# Patient Record
Sex: FEMALE | Race: WHITE | NOT HISPANIC OR LATINO | Employment: FULL TIME | URBAN - METROPOLITAN AREA
[De-identification: names, ages, dates, MRNs, and addresses within clinical notes are randomized per-mention and may not be internally consistent; named-entity substitution may affect disease eponyms.]

---

## 2017-01-25 ENCOUNTER — MEDICAL CORRESPONDENCE (OUTPATIENT)
Dept: HEALTH INFORMATION MANAGEMENT | Facility: CLINIC | Age: 42
End: 2017-01-25

## 2017-04-26 ENCOUNTER — TRANSFERRED RECORDS (OUTPATIENT)
Dept: HEALTH INFORMATION MANAGEMENT | Facility: CLINIC | Age: 42
End: 2017-04-26

## 2017-05-15 ENCOUNTER — TELEPHONE (OUTPATIENT)
Dept: DERMATOLOGY | Facility: CLINIC | Age: 42
End: 2017-05-15

## 2017-05-15 ENCOUNTER — OFFICE VISIT (OUTPATIENT)
Dept: DERMATOLOGY | Facility: CLINIC | Age: 42
End: 2017-05-15

## 2017-05-15 VITALS — DIASTOLIC BLOOD PRESSURE: 78 MMHG | SYSTOLIC BLOOD PRESSURE: 124 MMHG | HEART RATE: 61 BPM

## 2017-05-15 DIAGNOSIS — M35.9 AUTOIMMUNE DISEASE (H): ICD-10-CM

## 2017-05-15 DIAGNOSIS — N92.6 IRREGULAR MENSTRUAL CYCLE: ICD-10-CM

## 2017-05-15 DIAGNOSIS — D23.9 DERMAL NEVUS: ICD-10-CM

## 2017-05-15 DIAGNOSIS — L63.9 ALOPECIA AREATA: ICD-10-CM

## 2017-05-15 DIAGNOSIS — L63.9 ALOPECIA AREATA: Primary | ICD-10-CM

## 2017-05-15 DIAGNOSIS — L30.9 DERMATITIS: ICD-10-CM

## 2017-05-15 LAB
ALBUMIN SERPL-MCNC: 3.4 G/DL (ref 3.4–5)
BASOPHILS # BLD AUTO: 0.1 10E9/L (ref 0–0.2)
BASOPHILS NFR BLD AUTO: 0.8 %
DEPRECATED CALCIDIOL+CALCIFEROL SERPL-MC: 39 UG/L (ref 20–75)
DHEA-S SERPL-MCNC: 26 UG/DL (ref 35–430)
DIFFERENTIAL METHOD BLD: NORMAL
EOSINOPHIL # BLD AUTO: 0.3 10E9/L (ref 0–0.7)
EOSINOPHIL NFR BLD AUTO: 4.9 %
ERYTHROCYTE [DISTWIDTH] IN BLOOD BY AUTOMATED COUNT: 13.2 % (ref 10–15)
FERRITIN SERPL-MCNC: 51 NG/ML (ref 12–150)
HCT VFR BLD AUTO: 43.2 % (ref 35–47)
HGB BLD-MCNC: 14 G/DL (ref 11.7–15.7)
IMM GRANULOCYTES # BLD: 0 10E9/L (ref 0–0.4)
IMM GRANULOCYTES NFR BLD: 0.3 %
IRON SATN MFR SERPL: 46 % (ref 15–46)
IRON SERPL-MCNC: 225 UG/DL (ref 35–180)
LYMPHOCYTES # BLD AUTO: 1.9 10E9/L (ref 0.8–5.3)
LYMPHOCYTES NFR BLD AUTO: 29.9 %
MCH RBC QN AUTO: 30.8 PG (ref 26.5–33)
MCHC RBC AUTO-ENTMCNC: 32.4 G/DL (ref 31.5–36.5)
MCV RBC AUTO: 95 FL (ref 78–100)
MONOCYTES # BLD AUTO: 0.6 10E9/L (ref 0–1.3)
MONOCYTES NFR BLD AUTO: 9.1 %
NEUTROPHILS # BLD AUTO: 3.5 10E9/L (ref 1.6–8.3)
NEUTROPHILS NFR BLD AUTO: 55 %
NRBC # BLD AUTO: 0 10*3/UL
NRBC BLD AUTO-RTO: 0 /100
PLATELET # BLD AUTO: 351 10E9/L (ref 150–450)
PROT SERPL-MCNC: 7 G/DL (ref 6.8–8.8)
RBC # BLD AUTO: 4.55 10E12/L (ref 3.8–5.2)
THYROPEROXIDASE AB SERPL-ACNC: <10 IU/ML
TIBC SERPL-MCNC: 488 UG/DL (ref 240–430)
TSH SERPL DL<=0.005 MIU/L-ACNC: 1.62 MU/L (ref 0.4–4)
WBC # BLD AUTO: 6.4 10E9/L (ref 4–11)

## 2017-05-15 RX ORDER — CLOBETASOL PROPIONATE 0.05 G/100ML
SHAMPOO TOPICAL
Qty: 1 BOTTLE | Refills: 3 | Status: SHIPPED | OUTPATIENT
Start: 2017-05-15 | End: 2017-05-23

## 2017-05-15 RX ORDER — NORETHINDRONE ACETATE AND ETHINYL ESTRADIOL .03; 1.5 MG/1; MG/1
TABLET ORAL DAILY
COMMUNITY
End: 2017-08-19

## 2017-05-15 RX ORDER — CYCLOSPORINE 25 MG/1
CAPSULE ORAL DAILY
COMMUNITY
Start: 2017-04-03 | End: 2017-08-19

## 2017-05-15 RX ORDER — MULTIVITAMIN
TABLET ORAL DAILY
COMMUNITY

## 2017-05-15 ASSESSMENT — PAIN SCALES - GENERAL: PAINLEVEL: NO PAIN (0)

## 2017-05-15 NOTE — PATIENT INSTRUCTIONS
-Use clobetasol shampoo every day for 1 week since weaning off Cyclosporine  -Clobetasol Shampoo for new hair loss prevention.   --Put on dry scalp, massage into scalp all over, leave on for 10 minutes, then lather and rinse out. Do this at least 3-4 times a week, and use other shampoos in between days.   -National Alopecia Arreata Foundation for more information  -Follow up in 6-8 weeks

## 2017-05-15 NOTE — NURSING NOTE
Dermatology Rooming Note    Lisbet Dimas's goals for this visit include:   Chief Complaint   Patient presents with     Hair/Scalp Problem     Alopecia Areata. Lisbet notes that she has been getting kenalog injections, which was helping. But now she is getting new spots.     Veronica Brooks, CMA

## 2017-05-15 NOTE — MR AVS SNAPSHOT
After Visit Summary   5/15/2017    Lisbet Dimas    MRN: [id number]           Patient Information     Date Of Birth          1975        Visit Information        Provider Department      5/15/2017 7:30 AM Ele Lo MD [facility] Dermatology        Care Instructions    -Use clobetasol shampoo every day for 1 week since weaning off Cyclosporine  -Clobetasol Shampoo for new hair loss prevention.   --Put on dry scalp, massage into scalp all over, leave on for 10 minutes, then lather and rinse out. Do this at least 3-4 times a week, and use other shampoos in between days.   -National Alopecia Arreata Foundation for more information  -Follow up in 6-8 weeks        Follow-ups after your visit        Your next 10 appointments already scheduled     May 15, 2017  9:00 AM CDT   LAB with [facility] ([facility])    [address] [place] [id number] [phone]           Patient must bring picture ID.  Patient should be prepared to give a urine specimen  Please do not eat 10-12 hours before your appointment if you are coming in fasting for labs on lipids, cholesterol, or glucose (sugar).  Pregnant women should follow their Care Team instructions. Water with medications is okay. Do not drink coffee or other fluids.   If you have concerns about taking  your medications, please ask at office or if scheduling via [place], send a message by clicking on Secure Messaging, Message Your Care Team.              Who to contact     Please call your clinic at [phone] to:    Ask questions about your health    Make or cancel appointments    Discuss your medicines    Learn about your test results    Speak to your doctor   If you have compliments or concerns about an experience at your clinic, or if you wish to file a complaint, please contact [facility] Patient Relations at [phone] or email us at  [email or web address]         Additional Information About Your Visit        MyChart Information     [employer] gives you secure access to your electronic health record. If you see a primary care provider, you can also send messages to your care team and make appointments. If you have questions, please call your primary care clinic.  If you do not have a primary care provider, please call [phone] and they will assist you.      [employer] is an electronic gateway that provides easy, online access to your medical records. With [employer], you can request a clinic appointment, read your test results, renew a prescription or communicate with your care team.     To access your existing account, please contact your [facility] or call [phone] for assistance.        Care EveryWhere ID     This is your Care EveryWhere ID. This could be used by other organizations to access your [place] medical records  [id number]        Your Vitals Were     Pulse                   61            Blood Pressure from Last 3 Encounters:   05/15/17 124/78    Weight from Last 3 Encounters:   No data found for Wt              Today, you had the following     No orders found for display       Primary Care Provider    None Specified       No primary provider on file.        Thank you!     Thank you for choosing [facility] DERMATOLOGY  for your care. Our goal is always to provide you with excellent care. Hearing back from our patients is one way we can continue to improve our services. Please take a few minutes to complete the written survey that you may receive in the mail after your visit with us. Thank you!             Your Updated Medication List - Protect others around you: Learn how to safely use, store and throw away your medicines at www.disposemymeds.org.          This list is accurate as of: 5/15/17  8:46 AM.  Always use your most recent med list.                   Brand Name Dispense  Instructions for use    Biotin 5000 MCG Caps      daily       cycloSPORINE modified capsule      daily       Multiple vitamin Tabs      daily       norethindrone-ethinyl estradiol 1.5-30 MG-MCG per tablet    MICROGESTIN 1.5/30     daily

## 2017-05-15 NOTE — LETTER
5/15/2017       RE: Lisbet Dimas  [address] [id number]     Dear Colleague,    Thank you for referring your patient, Lisbet Dimas, to the [facility] DERMATOLOGY at [facility]. Please see a copy of my visit note below.    [facility] Dermatology Note      Dermatology Problem List:  1.Alopecia areata s/p treatment with: minidoxil 5%, Rogaine, q 4 week IL kenalog injections 2.5 cc with 0.2cc to eyebrow, protopic 0.1% ointment, Prednisone 20 day taper that began at 40mg, Diprolene 0.05% lotion, Cyclosporine 100mg daily for1 week then every other day for 2 weeks, Clobetasol 0.05% drops  -Current treatment: Clobetasol 0.05% shampoo 3-4 times/week,  Biotin 5000 mcg po daily, ILK injections every 4 weeks, once weekly Rogaine foam, Gengraf 25 mg po daily (last dose today).  -Labs 5/15/17 pending: thyroid peroxidase Ab, Testosterone, DHEAS.    -labs: CBC, Ferritin, Protein, TSH, Vit D, Albumin all wnl.    -Labs revealed High Iron and iron binding.   2. Keratosis on upper arms  3. Clinically benign nevi on the right superior anterior ear    Encounter Date: May 15, 2017    CC:  Chief Complaint   Patient presents with     Hair/Scalp Problem     Alopecia Areata. Lisbet notes that she has been getting kenalog injections, which was helping. But now she is getting new spots.         History of Present Illness:  Ms. Lisbet Dimas is a 41 year old female who presents as a referral from Dr. Downey due to ophasis type pattern alopecia. Hair loss began in the fall 2014 that started in the front and then some patches on the right side of the head, and switched provider due to health insurance issues. She reports that this past fall her hair loss progressively has gotten worse with hair loss in the back. She had hair regrowth with kenalog injections but reports ongoing hair loss despite this. She has never had a biopsy taken. She reports that the hair loss in  the back is difficult to tell if it has regrowth with kenalog injections. She currently is using biotin daily and has been receiving IL  kenalog injections every 4 weeks and a one month course of po BID cyclosporine (Gengraf) 25 mg, as well as Rogaine foam with Dr. Downey. In the past she has tried Rogain, 5% minoxidil and Protopic solely on her left eyebrow. She denies prior use of minocycline or doxycycline or use with anti-dandruff shampoo, and has recently begun using [employer] derma care once a week.     Prior to the onset of her hairloss she was previously healthy, and admits to having some life stress with moving to a new place and studying to take the Bar exam, but does not think this was a trigger for her hair loss. She is an occasional meat eater with once weekly chicken or turkey. She also notes irregular menses initially thought due to exercise use as well as method of taking oral contraceptives. She does note that when she does take the sugar pill part of the contraception she does have a period. She complains of itching, without burning or pain, red spots or scarring with only a red birthmark on her posterior scalp. She has had her thyroid checked and notes her TSH is going up. She has constipation, dry skin, fatigue, personal history of keratois on her arms, heat intolerance, weight gain. She had a positive PAP with colposcopy with HPV positivity. She denies SOB, lightheadedness, night sweats, fevers, chills, unintentional weight loss, joint pain.     Past Medical History:   Previously healthy denies any other medical issues other than abnormal PAP    Surgical History:  Colposcopy  [place] teeth removal   Laser surgery on her eye for vision correction.     Social History:  The patient works as a . The patient admits to use of tanning beds a handful of times.    Family History:  There is no family history of skin cancer., There is no family history of melanoma., There is no family history of  asthma, eczema or allergies. and There is a family history of hair loss in the patient's brother with a receding hairline, and both grandfathers had male pattern baldness.   Mother with Vitiligo, psoriasis, Diabetes and Fe deficiency anemia. Brother with DMT2.  Sister with Thyroid issues and niece with thyroid issues, and another sister with vitiligo, Her Father has a prothrombin gene mutation with easy clotting.     Medications:  Current Outpatient Prescriptions   Medication Sig Dispense Refill     Biotin 5000 MCG CAPS daily       GENGRAF 25 MG PO CAPSULE daily       norethindrone-ethinyl estradiol (MICROGESTIN 1.5/30) 1.5-30 MG-MCG per tablet daily       Multiple vitamin TABS daily       No Known Allergies      Review of Systems:  -Skin/Heme New Pt: The patient denies frequent sun exposure. wears sunscreen SPF 30. The patient denies excessive scarring or problems healing except as per HPI. The patient denies excessive bleeding., Allergy/Immunology: No history of nickel or other skin allergy. No history of asthma or hay fever.,   GI: The patient denies nausea, vomiting, diarrhea or abdominal pain. She has constipation.  Rheum: The patient denies arthralgia, joint stiffness, joint swelling or myalgias. Her right knee bothers her but she thinks its an overuse injury from running.   -Constitutional: The patient denies fatigue, fevers, chills, unintended weight loss, and night sweats.  -HEENT: Patient denies nonhealing oral sores.  -Skin: As above in HPI. No additional skin concerns.    Physical exam:  Vitals: /78  Pulse 61  GEN: This is a well developed, well-nourished female in no acute distress, in a pleasant mood.    SKIN: Sun-exposed skin, which includes the head/face, neck, both arms, digits, and/or nails was examined.   -Frontal scalp and bilateral temporal scalp with patches with fine hair growth without scarring or redness. -Negative hair pull test with follicular prominence throughout  her scalp.    -Some atrophy of skin from prior kenalog injections   -Occiput scalp with what appears to be a congential vascular lesion, with smooth skin, patches with fine hair without visible pinpoint red papules or scaring.   -No visible nail pitting  - 3 mm skin-toned, soft, smooth verrucous appearing nevoid appearing lesion on the right temporal region superior to the eye along the eyeglass line.   -No other lesions of concern on areas examined.     Impression/Plan:  1. Alopecia areata  Labs today revealed High Iron and Iron binding with normal Ferritin.     Current treatment: Clobetasol 0.05% shampoo 3-4 times/week,  Biotin 5000 mcg po daily, ILK injections every 4 weeks, once weekly Rogaine foam, Gengraf 25 mg po daily (last dose today).    Photos taken today    Labs: CBC, DHEAS, Testosterone, Ferritin, Iron studies, TSH with free T4, Thyroid peroxidase antibodies, Vit D deficiency, albumin level.     Wean off Cyclosporine (Gengraf)    Clobetasol shampoo qday every week for one week, and then go to 3-4 times alternating with regular shampoo wash.     Follow with PCP for hypothyroid symptoms and labs, will also get labs today looking for thyroid antibodies.     She will need to follow with PCP for  Iron abnormallties    Information given on national alopecia areata foundation and discussed the local support group      2. Clinically benign nevi on the right superior anterior ear    Benign nature was discussed.No further intervention required at this time.     Discussed possibility of future shave biopsy procedure for removal and comfort as this spot irritates and bothers her with eye glass wear.     3. Seborrheic Keratosis on the upper arms    Sun precaution was advised including the use of sun screens of SPF 30 or higher, sun protective clothing, and avoidance of tanning beds.    Continue with daily moisturizer     CC Dr. Octavio Downey, and Dr. Yani Padillaaprparesh on close of this encounter.  Follow-up in 6-8  weeks, earlier for new or changing lesions.     Staff Involved:  Scribed by Nida Goodman, MS4 for Dr. Lo.      I agree with the PFSH and ROS as completed by the Medical Student. The remainder of the encounter was performed by me and scribed by the Medical Student. The scribed note accurately reflects my personal services and the medical decisions made by me.      Ele Lo MD  Professor and Chair  Department of Dermatology  [facility]      Pictures were placed in Pt's chart today for future reference.              Again, thank you for allowing me to participate in the care of your patient.      Sincerely,    Ele Lo MD

## 2017-05-15 NOTE — PROGRESS NOTES
[facility] Dermatology Note      Dermatology Problem List:  1.Alopecia areata s/p treatment with: minidoxil 5%, Rogaine, q 4 week IL kenalog injections 2.5 cc with 0.2cc to eyebrow, protopic 0.1% ointment, Prednisone 20 day taper that began at 40mg, Diprolene 0.05% lotion, Cyclosporine 100mg daily for1 week then every other day for 2 weeks, Clobetasol 0.05% drops  -Current treatment: Clobetasol 0.05% shampoo 3-4 times/week,  Biotin 5000 mcg po daily, ILK injections every 4 weeks, once weekly Rogaine foam, Gengraf 25 mg po daily (last dose today).  -Labs 5/15/17 pending: thyroid peroxidase Ab, Testosterone, DHEAS.    -labs: CBC, Ferritin, Protein, TSH, Vit D, Albumin all wnl.    -Labs revealed High Iron and iron binding.   2. Keratosis on upper arms  3. Clinically benign nevi on the right superior anterior ear    Encounter Date: May 15, 2017    CC:  Chief Complaint   Patient presents with     Hair/Scalp Problem     Alopecia Areata. Lisbet notes that she has been getting kenalog injections, which was helping. But now she is getting new spots.         History of Present Illness:  Ms. Lisbet Dimas is a 41 year old female who presents as a referral from Dr. Downey due to ophasis type pattern alopecia. Hair loss began in the fall 2014 that started in the front and then some patches on the right side of the head, and switched provider due to health insurance issues. She reports that this past fall her hair loss progressively has gotten worse with hair loss in the back. She had hair regrowth with kenalog injections but reports ongoing hair loss despite this. She has never had a biopsy taken. She reports that the hair loss in the back is difficult to tell if it has regrowth with kenalog injections. She currently is using biotin daily and has been receiving IL  kenalog injections every 4 weeks and a one month course of po BID cyclosporine (Gengraf) 25 mg, as well as Rogaine foam with Dr. Downey. In the past  she has tried Rogain, 5% minoxidil and Protopic solely on her left eyebrow. She denies prior use of minocycline or doxycycline or use with anti-dandruff shampoo, and has recently begun using [employer] derma care once a week.     Prior to the onset of her hairloss she was previously healthy, and admits to having some life stress with moving to a new place and studying to take the Bar exam, but does not think this was a trigger for her hair loss. She is an occasional meat eater with once weekly chicken or turkey. She also notes irregular menses initially thought due to exercise use as well as method of taking oral contraceptives. She does note that when she does take the sugar pill part of the contraception she does have a period. She complains of itching, without burning or pain, red spots or scarring with only a red birthmark on her posterior scalp. She has had her thyroid checked and notes her TSH is going up. She has constipation, dry skin, fatigue, personal history of keratois on her arms, heat intolerance, weight gain. She had a positive PAP with colposcopy with HPV positivity. She denies SOB, lightheadedness, night sweats, fevers, chills, unintentional weight loss, joint pain.     Past Medical History:   Previously healthy denies any other medical issues other than abnormal PAP    Surgical History:  Colposcopy  [place] teeth removal   Laser surgery on her eye for vision correction.     Social History:  The patient works as a . The patient admits to use of tanning beds a handful of times.    Family History:  There is no family history of skin cancer., There is no family history of melanoma., There is no family history of asthma, eczema or allergies. and There is a family history of hair loss in the patient's brother with a receding hairline, and both grandfathers had male pattern baldness.   Mother with Vitiligo, psoriasis, Diabetes and Fe deficiency anemia. Brother with DMT2.  Sister with Thyroid  issues and niece with thyroid issues, and another sister with vitiligo, Her Father has a prothrombin gene mutation with easy clotting.     Medications:  Current Outpatient Prescriptions   Medication Sig Dispense Refill     Biotin 5000 MCG CAPS daily       GENGRAF 25 MG PO CAPSULE daily       norethindrone-ethinyl estradiol (MICROGESTIN 1.5/30) 1.5-30 MG-MCG per tablet daily       Multiple vitamin TABS daily       No Known Allergies      Review of Systems:  -Skin/Heme New Pt: The patient denies frequent sun exposure. wears sunscreen SPF 30. The patient denies excessive scarring or problems healing except as per HPI. The patient denies excessive bleeding., Allergy/Immunology: No history of nickel or other skin allergy. No history of asthma or hay fever.,   GI: The patient denies nausea, vomiting, diarrhea or abdominal pain. She has constipation.  Rheum: The patient denies arthralgia, joint stiffness, joint swelling or myalgias. Her right knee bothers her but she thinks its an overuse injury from running.   -Constitutional: The patient denies fatigue, fevers, chills, unintended weight loss, and night sweats.  -HEENT: Patient denies nonhealing oral sores.  -Skin: As above in HPI. No additional skin concerns.    Physical exam:  Vitals: /78  Pulse 61  GEN: This is a well developed, well-nourished female in no acute distress, in a pleasant mood.    SKIN: Sun-exposed skin, which includes the head/face, neck, both arms, digits, and/or nails was examined.   -Frontal scalp and bilateral temporal scalp with patches with fine hair growth without scarring or redness. -Negative hair pull test with follicular prominence throughout  her scalp.   -Some atrophy of skin from prior kenalog injections   -Occiput scalp with what appears to be a congential vascular lesion, with smooth skin, patches with fine hair without visible pinpoint red papules or scaring.   -No visible nail pitting  - 3 mm skin-toned, soft, smooth verrucous  appearing nevoid appearing lesion on the right temporal region superior to the eye along the eyeglass line.   -No other lesions of concern on areas examined.     Impression/Plan:  1. Alopecia areata  Labs today revealed High Iron and Iron binding with normal Ferritin.     Current treatment: Clobetasol 0.05% shampoo 3-4 times/week,  Biotin 5000 mcg po daily, ILK injections every 4 weeks, once weekly Rogaine foam, Gengraf 25 mg po daily (last dose today).    Photos taken today    Labs: CBC, DHEAS, Testosterone, Ferritin, Iron studies, TSH with free T4, Thyroid peroxidase antibodies, Vit D deficiency, albumin level.     Wean off Cyclosporine (Gengraf)    Clobetasol shampoo qday every week for one week, and then go to 3-4 times alternating with regular shampoo wash.     Follow with PCP for hypothyroid symptoms and labs, will also get labs today looking for thyroid antibodies.     She will need to follow with PCP for  Iron abnormallties    Information given on national alopecia areata foundation and discussed the local support group      2. Clinically benign nevi on the right superior anterior ear    Benign nature was discussed.No further intervention required at this time.     Discussed possibility of future shave biopsy procedure for removal and comfort as this spot irritates and bothers her with eye glass wear.     3. Seborrheic Keratosis on the upper arms    Sun precaution was advised including the use of sun screens of SPF 30 or higher, sun protective clothing, and avoidance of tanning beds.    Continue with daily moisturizer     CC Dr. Octavio Downey, [facility] on close of this encounter.  Follow-up in 6-8 weeks, earlier for new or changing lesions.     Staff Involved:  Scribed by Nida Goodman, MS4 for Dr. Lo.      I agree with the PFSH and ROS as completed by the Medical Student. The remainder of the encounter was performed by me and scribed by the Medical Student. The scribed  note accurately reflects my personal services and the medical decisions made by me.      Ele Lo MD  Professor and Chair  Department of Dermatology  [facility]

## 2017-05-16 LAB
SHBG SERPL-SCNC: 106 NMOL/L (ref 30–135)
TESTOST FREE SERPL-MCNC: 0.01 NG/DL (ref 0.13–0.92)
TESTOST SERPL-MCNC: 5 NG/DL (ref 8–60)

## 2017-05-16 NOTE — TELEPHONE ENCOUNTER
Prior Authorization Retail Medication Request  Medication/Dose: clobetasol (Clobex) 0.05% shampoo  Diagnosis and ICD code: Autoimmune disease (H) (M35.9); Dermatitis (L30.9)  New/Renewal/Insurance Change PA: New  Previously Tried and Failed Therapies: past treatment with: minidoxil 5%, Rogaine, q 4 week kenalog injections 2.5 cc with 0.2cc to eyebrow, protopic (triamcinolone) 0.1% ointment, Prednisone 20 day taper that began at 40mg, Diprolene 0.05% lotion, Cyclosporine 100mg daily for1 week then every other day for 2 weeks, Clobetasol 0.05% drops    -Current treatment: Clobetasol 0.05% shampoo 3-4 times/week, Biotin 5000 mcg po daily, ILK injections every 4 weeks, once weekly Rogaine foam, Gengraf 25 mg po daily (last dose today).    Insurance ID (if provided): [id number]  Insurance Phone (if provided): [phone]

## 2017-05-18 NOTE — TELEPHONE ENCOUNTER
Prior Authorization Approval    Authorization Effective Date: 4/18/2017  Authorization Expiration Date: 4/18/2018  Medication: clobetasol (Clobex) 0.05% shampoo - approved  Approved Dose/Quantity:   Reference #: CMM key# [id number]   Insurance Company: [employer] - Phone [phone] Fax [phone]  Expected CoPay: $240.00     CoPay Card Available:      Foundation Assistance Needed:    Which Pharmacy is filling the prescription (Not needed for infusion/clinic administered): [facility] [id number] [address]  Pharmacy Notified: Yes  Patient Notified: Yes    PATIENT HAS A DEDUCTIBLE THAT NEEDS TO BE MET IN ORDER TO LOWER CO-PAY

## 2017-05-18 NOTE — TELEPHONE ENCOUNTER
PA Initiation    Medication: clobetasol (Clobex) 0.05% shampoo  Insurance Company: [employer] - Phone [phone] Fax [phone]  Pharmacy Filling the Rx: [facility] [id number] [place] [address] [address]  Filling Pharmacy Phone: [phone]  Filling Pharmacy Fax:    Start Date: 5/18/2017

## 2017-05-23 DIAGNOSIS — M35.9 AUTOIMMUNE DISEASE (H): ICD-10-CM

## 2017-05-23 DIAGNOSIS — L30.9 DERMATITIS: ICD-10-CM

## 2017-05-23 RX ORDER — CLOBETASOL PROPIONATE 0.05 G/100ML
SHAMPOO TOPICAL
Qty: 1 BOTTLE | Refills: 3 | Status: SHIPPED | OUTPATIENT
Start: 2017-05-23

## 2017-05-23 NOTE — TELEPHONE ENCOUNTER
Per pt request:  Could you send my Rx for the shampoo to [facility] [address] in [place]? [facility] and the price was really high. The [employer] coupon I got while at my appointment was for [facility].

## 2017-07-24 ENCOUNTER — TELEPHONE (OUTPATIENT)
Dept: DERMATOLOGY | Facility: CLINIC | Age: 42
End: 2017-07-24

## 2017-07-24 NOTE — TELEPHONE ENCOUNTER
Patient is wondering when she will be scheduled for her next follow up visit as recommendations were 6-8 weeks at [facility] on 5/18/17. She states her hair loss is worsening. She is also wondering if she should follow up with referring dermatologist since ILK injections were recommended every 4 weeks and she was not aware of this.    Patient would like a response back via [employer]

## 2017-08-19 ENCOUNTER — OFFICE VISIT (OUTPATIENT)
Dept: DERMATOLOGY | Facility: CLINIC | Age: 42
End: 2017-08-19

## 2017-08-19 VITALS — DIASTOLIC BLOOD PRESSURE: 67 MMHG | SYSTOLIC BLOOD PRESSURE: 101 MMHG | HEART RATE: 61 BPM

## 2017-08-19 DIAGNOSIS — R89.9 ABNORMAL LABORATORY TEST RESULT: ICD-10-CM

## 2017-08-19 DIAGNOSIS — D23.9 DERMAL NEVUS: ICD-10-CM

## 2017-08-19 DIAGNOSIS — L63.9 ALOPECIA AREATA: Primary | ICD-10-CM

## 2017-08-19 DIAGNOSIS — M35.9 AUTOIMMUNE DISEASE (H): ICD-10-CM

## 2017-08-19 RX ORDER — NORETHINDRONE ACETATE AND ETHINYL ESTRADIOL 1.5-30(21)
KIT ORAL
COMMUNITY
Start: 2017-07-31

## 2017-08-19 ASSESSMENT — PAIN SCALES - GENERAL: PAINLEVEL: NO PAIN (0)

## 2017-08-19 NOTE — PATIENT INSTRUCTIONS
When there is a new spot: do clobetasol shampoo daily until the hairless spot doesn't grow in size and there is new hair growth (fine baby hair counts)    Regular treatment:  - clobetasol 0.05% shampoo every other day (3 times a week)  - steroid injections every 4 to 6 weeks with Dr. Doll  - rogaine foam once daily every day  - continue Biotin and multivitamin      Recheck iron studies with Dr. Doll    With PCP:   - discuss about iron study results.

## 2017-08-19 NOTE — LETTER
"8/19/2017       RE: Lisbet Dimas  [address] [address]     Dear Colleague,    Thank you for referring your patient, Lisbet Dimas, to the [facility] DERMATOLOGY at [facility]. Please see a copy of my visit note below.    [facility] Dermatology Note      Dermatology Problem List:  1.Alopecia areata   -s/p treatment with: minidoxil 5%, Rogaine, q 4 week IL kenalog injections 2.5 cc with 0.2cc to eyebrow, protopic 0.1% ointment, Prednisone 20 day taper that began at 40mg, Diprolene 0.05% lotion, Cyclosporine 100mg daily for 1 week then every other day for 2 weeks then 25 mg po daily (last dose 5/15/17), Clobetasol 0.05% drops  -Current treatment: Clobetasol 0.05% shampoo 3-4 times/week,  Biotin 5000 mcg po daily, ILK injections every 4-6 weeks, once daily Rogaine foam.  -Labs 5/15/17 pending: thyroid peroxidase Ab, Testosterone, DHEAS.                         -labs: CBC, Ferritin, Protein, TSH, Vit D, Albumin all wnl.                         -Labs revealed High Iron and iron binding.       2. Clinically benign nevus on the right superior anterior ear  - shave removal has been discussed.    Encounter Date: Aug 19, 2017    CC:  Chief Complaint   Patient presents with     Hair Loss     A A follow up, Lisbet states \" it is getting worse.\"         History of Present Illness:  Ms. Lisbet Dimas is a 42 year old female who presents as a follow-up for alopecia areata. The patient was last seen 5/15/17 when she finished her last dose of Gengraf. She thinks hair loss in the occipital scalp is getting worse and it is accompanied by pruritus in that area and sensitivity to touch. She also thinks right temporal hairline is receding. However she does see hair regrowth on the left temporal scalp. Otherwise, she hasn't noticed a new area of hair loss or nail changes. Her current treatment consists of clobetasol 0.05% shampoo every other day, biotin " 5000 mcg po daily, and a multivitamin. She didn't realize that she was supposed to continue ILK injections every 4 weeks until she called one of our nurses this past July and was able to get ILK once since the last visit. She saw Dr. Doll on 7/20/17 when 3 areas of alopecia were injected with 5 mg/mL of triamcinolone, a total of 2 cc. She thinks that injections helped with some hair regrowth. She also did not know that she was supposed to be using Rogaine foam so hasn't been using it since the last visit.     Past Medical History:   Patient Active Problem List   Diagnosis     Alopecia areata     Autoimmune disease (H)     Dermal nevus     Irregular menstrual cycle     No past medical history on file.  No past surgical history on file.    Social History:  The patient works as a . The patient admits to use of tanning beds a handful of times. She has been training for a marathon this summer.    Family History:  There is no family history of skin cancer., There is no family history of melanoma., There is no family history of asthma, eczema or allergies. and There is a family history of hair loss in the patient's brother with a receding hairline, and both grandfathers had male pattern baldness.   Mother with Vitiligo, psoriasis, Diabetes and Fe deficiency anemia. Brother with DMT2.  Sister with Thyroid issues and niece with thyroid issues, and another sister with vitiligo, Her Father has a prothrombin gene mutation with easy clotting.    Medications:  Current Outpatient Prescriptions   Medication Sig Dispense Refill     norethindrone-ethinyl estradiol-iron (BLISOVI FE 1.5/30) 1.5-30 MG-MCG per tablet TAKE ONE TABLET BY MOUTH ONE TIME DAILY       clobetasol propionate (CLOBEX) 0.05 % SHAM Apply to a dry scalp, leave on for 10 minutes then lather and rinse. Do 5 days per week for the first week then every other day 1 Bottle 3     Biotin 5000 MCG CAPS daily       Multiple vitamin TABS daily       No Known  Allergies      Review of Systems:  -As per HPI  -Constitutional: The patient denies fatigue, fevers, chills, unintended weight loss, and night sweats.  -HEENT: Patient denies nonhealing oral sores.  -Skin: As above in HPI. No additional skin concerns.    Physical exam:  Vitals: /67  Pulse 61  GEN: This is a well developed, well-nourished female in no acute distress, in a pleasant mood.    SKIN: Sun-exposed skin, which includes the head/face, neck, both arms, digits, and/or nails was examined.   -Frontal scalp with robust regrowth of fine vellus hair and intermediate hair. Overall 50% improvement since the last visit.   -Right temporal scalp with mild enlargement in size of the patch with decreased density of hair with some atrophy of skin from prior ILK injections.   -Left temporal scalp with robust growth of indeterminate hair and vellus hair throughout, improved from prior visit.   -Occiput scalp with what appears to be a congential vascular lesion, with smooth skin, patches with fine vellus hair without visible pinpoint red papules or scaring, stable from the last visit.  -No visible nail pitting  - 3 mm skin-toned, soft, smooth verrucous appearing nevoid appearing lesion on the right temporal region superior to the eye along the eyeglass line.   -No other lesions of concern on areas examined.     Labs 5/15/2017  Ferritin 51  Iron 225 H  Iron binding capacity 488 H    Impression/Plan:  1. Alopecia areata. She has good response from ILK with evidence of regrowth of hair growth  in areas of injections. We recommended she continue ILK injections with Dr. Doll and follow with us as needed. Patient was advised to use clobetasol shampoo daily if she develops a new patch of hair loss until there is regrowth of hair. Otherwise she can continue her current treatment as below.     Continue 0.05% clobetasol shampoo every other day (3x/week)    Continue to get ILK injections every 4-6 weeks with Dr. Doll. (next  one is already scheduled for Sept 28th, 2017).    Continue Rogaine foam daily    Continue biotin supplementation.    Clobetasol shampoo daily if new patch appears until there is hair regrowth.     Photos taken today for reference    Given iron abnormalities from 5/15/17, patient was advised to have them repeated by Dr. Doll at the follow-up visit.     Follow-up with PCP regarding iron abnormalities       2. Clinically benign nevus on the right superior anterior ear  - have discussed previously the possibility of future shave biopsy procedure for removal and comfort as this spot irritates and bothers her with eye glass wear.     Follow-up prn.  CC: Dr. Justin Doll at [facility]    Staff Involved:  Scribed by Myrna Dyer (Jeong Min) MS4 for Dr. Lo.      I agree with the PFSH and ROS as completed by the Medical Student. The remainder of the encounter was performed by me and scribed by the Medical Student. The scribed note accurately reflects my personal services and the medical decisions made by me.      Ele Lo MD  Professor and Chair  Department of Dermatology  [facility]                                                    Again, thank you for allowing me to participate in the care of your patient.      Sincerely,    Ele Lo MD

## 2017-08-19 NOTE — MR AVS SNAPSHOT
After Visit Summary   8/19/2017    Lisbet Dimas    MRN: [id number]           Patient Information     Date Of Birth          1975        Visit Information        Provider Department      8/19/2017 8:30 AM Ele Lo MD [facility] Dermatology        Care Instructions    When there is a new spot: do clobetasol shampoo daily until the hairless spot doesn't grow in size and there is new hair growth (fine baby hair counts)    Regular treatment:  - clobetasol 0.05% shampoo every other day (3 times a week)  - steroid injections every 4 to 6 weeks with Dr. Doll  - rogaine foam once daily every day  - continue Biotin and multivitamin      Recheck iron studies with Dr. Doll    With PCP:   - discuss about iron study results.             Follow-ups after your visit        Who to contact     Please call your clinic at [phone] to:    Ask questions about your health    Make or cancel appointments    Discuss your medicines    Learn about your test results    Speak to your doctor   If you have compliments or concerns about an experience at your clinic, or if you wish to file a complaint, please contact [facility] Patient Relations at [phone] or email us at [email or web address]         Additional Information About Your Visit        [employer] Information     [employer] gives you secure access to your electronic health record. If you see a primary care provider, you can also send messages to your care team and make appointments. If you have questions, please call your primary care clinic.  If you do not have a primary care provider, please call [phone] and they will assist you.      [employer] is an electronic gateway that provides easy, online access to your medical records. With [employer], you can request a clinic appointment, read your test results, renew a prescription or communicate with your care team.     To access your existing account, please  contact your [facility] or call [phone] for assistance.        Care EveryWhere ID     This is your Care EveryWhere ID. This could be used by other organizations to access your [place] medical records  [id number]        Your Vitals Were     Pulse                   61            Blood Pressure from Last 3 Encounters:   08/19/17 101/67   05/15/17 124/78    Weight from Last 3 Encounters:   No data found for Wt              Today, you had the following     No orders found for display       Primary Care Provider    None Specified       No primary provider on file.        Equal Access to Services     ROCKY WORKMAN : Hadii aad ku hadasho Soomaali, waaxda luqadaha, qaybta kaalmada adeegyakevin, lawson quesada . So [facility] [phone].    ATENCIÓN: Si habla español, tiene a taylor disposición servicios gratuitos de asistencia lingüística. Llame al [phone].    We comply with applicable federal civil rights laws and [place] laws. We do not discriminate on the basis of race, color, national origin, age, disability sex, sexual orientation or gender identity.            Thank you!     Thank you for choosing [facility] DERMATOLOGY  for your care. Our goal is always to provide you with excellent care. Hearing back from our patients is one way we can continue to improve our services. Please take a few minutes to complete the written survey that you may receive in the mail after your visit with us. Thank you!             Your Updated Medication List - Protect others around you: Learn how to safely use, store and throw away your medicines at www.disposemymeds.org.          This list is accurate as of: 8/19/17  9:21 AM.  Always use your most recent med list.                   Brand Name Dispense Instructions for use Diagnosis    Biotin 5000 MCG Caps      daily        BLISOVI FE 1.5/30 1.5-30 MG-MCG per tablet   Generic drug:  norethindrone-ethinyl estradiol-iron      TAKE ONE  TABLET BY MOUTH ONE TIME DAILY        clobetasol propionate 0.05 % Sham    CLOBEX    1 Bottle    Apply to a dry scalp, leave on for 10 minutes then lather and rinse. Do 5 days per week for the first week then every other day    Autoimmune disease (H), Dermatitis       Multiple vitamin Tabs      daily

## 2017-08-19 NOTE — NURSING NOTE
"Dermatology Rooming Note    Lisbet Dimas's goals for this visit include:   Chief Complaint   Patient presents with     Hair Loss     A A follow up, Lisbet states \" it is getting worse.\"     Maricruz Lopez LPN  "

## 2017-08-20 NOTE — PROGRESS NOTES
"[facility] Dermatology Note      Dermatology Problem List:  1.Alopecia areata   -s/p treatment with: minidoxil 5%, Rogaine, q 4 week IL kenalog injections 2.5 cc with 0.2cc to eyebrow, protopic 0.1% ointment, Prednisone 20 day taper that began at 40mg, Diprolene 0.05% lotion, Cyclosporine 100mg daily for 1 week then every other day for 2 weeks then 25 mg po daily (last dose 5/15/17), Clobetasol 0.05% drops  -Current treatment: Clobetasol 0.05% shampoo 3-4 times/week,  Biotin 5000 mcg po daily, ILK injections every 4-6 weeks, once daily Rogaine foam.  -Labs 5/15/17 pending: thyroid peroxidase Ab, Testosterone, DHEAS.                         -labs: CBC, Ferritin, Protein, TSH, Vit D, Albumin all wnl.                         -Labs revealed High Iron and iron binding.       2. Clinically benign nevus on the right superior anterior ear  - shave removal has been discussed.    Encounter Date: Aug 19, 2017    CC:  Chief Complaint   Patient presents with     Hair Loss     A A follow up, Lisbet states \" it is getting worse.\"         History of Present Illness:  Ms. Lisbet Dimas is a 42 year old female who presents as a follow-up for alopecia areata. The patient was last seen 5/15/17 when she finished her last dose of Gengraf. She thinks hair loss in the occipital scalp is getting worse and it is accompanied by pruritus in that area and sensitivity to touch. She also thinks right temporal hairline is receding. However she does see hair regrowth on the left temporal scalp. Otherwise, she hasn't noticed a new area of hair loss or nail changes. Her current treatment consists of clobetasol 0.05% shampoo every other day, biotin 5000 mcg po daily, and a multivitamin. She didn't realize that she was supposed to continue ILK injections every 4 weeks until she called one of our nurses this past July and was able to get ILK once since the last visit. She saw Dr. Doll on 7/20/17 when 3 areas of alopecia were " injected with 5 mg/mL of triamcinolone, a total of 2 cc. She thinks that injections helped with some hair regrowth. She also did not know that she was supposed to be using Rogaine foam so hasn't been using it since the last visit.     Past Medical History:   Patient Active Problem List   Diagnosis     Alopecia areata     Autoimmune disease (H)     Dermal nevus     Irregular menstrual cycle     No past medical history on file.  No past surgical history on file.    Social History:  The patient works as a . The patient admits to use of tanning beds a handful of times. She has been training for a marathon this summer.    Family History:  There is no family history of skin cancer., There is no family history of melanoma., There is no family history of asthma, eczema or allergies. and There is a family history of hair loss in the patient's brother with a receding hairline, and both grandfathers had male pattern baldness.   Mother with Vitiligo, psoriasis, Diabetes and Fe deficiency anemia. Brother with DMT2.  Sister with Thyroid issues and niece with thyroid issues, and another sister with vitiligo, Her Father has a prothrombin gene mutation with easy clotting.    Medications:  Current Outpatient Prescriptions   Medication Sig Dispense Refill     norethindrone-ethinyl estradiol-iron (BLISOVI FE 1.5/30) 1.5-30 MG-MCG per tablet TAKE ONE TABLET BY MOUTH ONE TIME DAILY       clobetasol propionate (CLOBEX) 0.05 % SHAM Apply to a dry scalp, leave on for 10 minutes then lather and rinse. Do 5 days per week for the first week then every other day 1 Bottle 3     Biotin 5000 MCG CAPS daily       Multiple vitamin TABS daily       No Known Allergies      Review of Systems:  -As per HPI  -Constitutional: The patient denies fatigue, fevers, chills, unintended weight loss, and night sweats.  -HEENT: Patient denies nonhealing oral sores.  -Skin: As above in HPI. No additional skin concerns.    Physical exam:  Vitals: BP  101/67  Pulse 61  GEN: This is a well developed, well-nourished female in no acute distress, in a pleasant mood.    SKIN: Sun-exposed skin, which includes the head/face, neck, both arms, digits, and/or nails was examined.   -Frontal scalp with robust regrowth of fine vellus hair and intermediate hair. Overall 50% improvement since the last visit.   -Right temporal scalp with mild enlargement in size of the patch with decreased density of hair with some atrophy of skin from prior ILK injections.   -Left temporal scalp with robust growth of indeterminate hair and vellus hair throughout, improved from prior visit.   -Occiput scalp with what appears to be a congential vascular lesion, with smooth skin, patches with fine vellus hair without visible pinpoint red papules or scaring, stable from the last visit.  -No visible nail pitting  - 3 mm skin-toned, soft, smooth verrucous appearing nevoid appearing lesion on the right temporal region superior to the eye along the eyeglass line.   -No other lesions of concern on areas examined.     Labs 5/15/2017  Ferritin 51  Iron 225 H  Iron binding capacity 488 H    Impression/Plan:  1. Alopecia areata. She has good response from ILK with evidence of regrowth of hair growth  in areas of injections. We recommended she continue ILK injections with Dr. Doll and follow with us as needed. Patient was advised to use clobetasol shampoo daily if she develops a new patch of hair loss until there is regrowth of hair. Otherwise she can continue her current treatment as below.     Continue 0.05% clobetasol shampoo every other day (3x/week)    Continue to get ILK injections every 4-6 weeks with Dr. Doll. (next one is already scheduled for Sept 28th, 2017).    Continue Rogaine foam daily    Continue biotin supplementation.    Clobetasol shampoo daily if new patch appears until there is hair regrowth.     Photos taken today for reference    Given iron abnormalities from 5/15/17, patient  was advised to have them repeated by Dr. Doll at the follow-up visit.     Follow-up with PCP regarding iron abnormalities       2. Clinically benign nevus on the right superior anterior ear  - have discussed previously the possibility of future shave biopsy procedure for removal and comfort as this spot irritates and bothers her with eye glass wear.     Follow-up prn.  CC: Dr. Justin Doll at [facility]    Staff Involved:  Scribed by Myrna Dyer (Jeong Min) MS4 for Dr. Lo.      I agree with the PFSH and ROS as completed by the Medical Student. The remainder of the encounter was performed by me and scribed by the Medical Student. The scribed note accurately reflects my personal services and the medical decisions made by me.      Ele Lo MD  Professor and Chair  Department of Dermatology  [facility]

## 2017-09-03 PROBLEM — R89.9 ABNORMAL LABORATORY TEST RESULT: Status: ACTIVE | Noted: 2017-09-03

## 2018-01-21 ENCOUNTER — HEALTH MAINTENANCE LETTER (OUTPATIENT)
Age: 43
End: 2018-01-21

## 2019-09-09 ENCOUNTER — THERAPY VISIT (OUTPATIENT)
Dept: PHYSICAL THERAPY | Facility: CLINIC | Age: 44
End: 2019-09-09
Payer: COMMERCIAL

## 2019-09-09 DIAGNOSIS — S76.312A STRAIN OF LEFT HAMSTRING MUSCLE: ICD-10-CM

## 2019-09-09 DIAGNOSIS — M72.2 PLANTAR FASCIITIS: ICD-10-CM

## 2019-09-09 PROCEDURE — 97161 PT EVAL LOW COMPLEX 20 MIN: CPT | Mod: GP | Performed by: PHYSICAL THERAPIST

## 2019-09-09 PROCEDURE — 97110 THERAPEUTIC EXERCISES: CPT | Mod: GP | Performed by: PHYSICAL THERAPIST

## 2019-09-09 NOTE — LETTER
[facility] ATHLETIC [facility] PHYSICAL THERAPY  [address]  [facility] [address]  [phone]    2019    Re: Lisbet Dimas   :   1975  MRN:  [id number]   REFERRING PHYSICIAN:   Referred Self    [facility]    Date of Initial Evaluation:  2019  Visits:  Rxs Used: 1  Reason for Referral:     Plantar fasciitis  Strain of left hamstring muscle    EVALUATION SUMMARY    [facility]  Subjective:  Lisbet Dimas being seen for L foot and hamstring pain.   Problem began 2019 (2019 foot).  Problem occurred: Is trying to get back in to running to train for half marathon and about one month ago L foot started to get sore.  Started softball one night and provoked L foot pain.  19 patient was running to get a ball in softball and felt something in posterior medial thigh and then felt tightness and strain in the hamstring (that evening did not have the typical pain in foot but hamstring was sore).  Hamstring seems to be getting better.  Reported as 5/10 on pain scale. General health as reported by patient is good.  Pertinent medical history includes:  Overweight.  Other medical allergies details: none.  Surgeries include: None.  Current medications: Anti-inflammatory.  Primary job tasks include: Computer work and prolonged sitting.  Pain is described as aching, burning and sharp and is intermittent. Pain is worse during the day.  Since onset symptoms are unchanged (has not been running but softball still provokes).  Imaging testing: none.  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  Restrictions include: Working in normal job without restrictions.  Barriers include: None as reported by patient.  Red flags: Pain at rest/night.  Type of problem: Left ankle and left foot  This is a new condition.  Patient reports pain: Sub calcaneus (also lateral and medial  posterior heel).  Associated symptoms: Loss of motion/stiffness.  Symptoms are exacerbated by walking (getting up after sitting, day after softball, unable to run (has not run in 3 weeks other than softball), getting up after sitting at work) and relieved by ice and NSAID's (plantar fascia night splint, foam roll low back?).    Objective:  Flexibility/Screens:   Positive screens: Lumbar (+) L SLR with foot DF although (-) B slump  Lower Extremity:  Decreased left lower extremity flexibility: Hamstrings and Soleus      Re: Lisbet Dimas   :   1975    Ankle/Foot Evaluation  ROM:    AROM:    Dorsiflexion:   Left: 5   Right: 5  Plantarflexion: Left: 50     Right: 55  Inversion:   Left: WNL      Right: WNL  Eversion:  Left: WNL      Right: WNL    PROM:    Dorsiflexion:   Left: 10      Right: 10     Strength is normal (All strength 5/5 WNL including all diagonals.  MMT hip ABD L 4-/5, R 4/5, hip ext B 5-/5).    LIGAMENT TESTING: normal    PALPATION: normal  Left ankle tenderness not present at: gastroc/soleus; achilles tendon; plantar fascia or medial calcaneal    EDEMA: normal    MOBILITY TESTING: normal    FUNCTIONAL TESTS:   Quad:  Single Leg Squat Left: strain to hamstring; Control is mild loss of control.  Single Leg Squat Right: Control is mild loss of control  Bilateral Leg Squat: Control is mild loss of control    Proprioception:  [place] Balance Test:   Left: 30 sec with hip drop    Right: 30 sec with hip drop        Lumbar/SI Evaluation  ROM:  Arom wnl lumbar: 0/10 baseline prone assess REIL x 10 NE/NE to L foot pain with inversion.    Lumbar Myotomes: normal    Neural Tension/Mobility:    Left side: SLR and SLR w/DF (pain in LB) positive.  Left side: Slump  negative.   Right side: SLR w/DF; Slump or SLR  negative.     Lumbar Palpation:    Tenderness present at Left: Ischial Tuberosity  Tenderness not present at Left: Hamstrings  Tenderness not present at Right: Ischial Tuberosity or Hamstrings    Re:  Lisbet Dimas   :   1975    Assessment/Plan:    Patient is a 44 year old female with L foot and L hamstring complaints.    Patient has the following significant findings with corresponding treatment plan.                Diagnosis 1: L foot pain  Pain - manual therapy, self management, education and home program  Decreased ROM/flexibility - manual therapy, therapeutic exercise and home program  Decreased strength - therapeutic exercise, therapeutic activities and home program  Decreased proprioception - neuro re-education, therapeutic activities and home program  Decreased function - therapeutic activities and home program  Diagnosis 2: L hamstring strain  Pain -  manual therapy, self management, education and home program  Decreased ROM/flexibility - manual therapy, therapeutic exercise and home program  Decreased strength - therapeutic exercise, therapeutic activities and home program  Decreased proprioception - neuro re-education, therapeutic activities and home program  Decreased function - therapeutic activities and home program    Therapy Evaluation Codes:   Cumulative Therapy Evaluation is: Low complexity.    Previous and current functional limitations: (See Goal Flow Sheet for this information)    Short term and Long term goals: (See Goal Flow Sheet for this information)     Communication ability: Patient appears to be able to clearly communicate and understand verbal and written communication and follow directions correctly.  Treatment Explanation - The following has been discussed with the patient:     RX ordered/plan of care  Anticipated outcomes  Possible risks and side effects  This patient would benefit from PT intervention to resume normal activities.   Rehab potential is good.    Frequency: 1 X week, once daily  Duration: for 6 weeks  Discharge Plan: Achieve all LTG.  Independent in home treatment program.  Reach maximal therapeutic benefit.      Thank you for your  referral.      INQUIRIES  Therapist: Vida Ivan DPT   [facility]  [address] [address]  Phone: [phone]  Fax: [phone]

## 2019-09-09 NOTE — PROGRESS NOTES
[facility] Initial Evaluation  Subjective:    Lisbet Dimas being seen for L foot and hamstring pain.   Problem began 9/4/2019 (8/2019 foot). Problem occurred: Is trying to get back in to running to train for half marathon and about one month ago L foot started to be sore.  Started softball one night and provoked L foot pain.  9/4/19 patient was running to get a ball in softball and felt something in posterior medial thigh and then felt tightness and strain in the hamstring (that evening did not have the typical pain in foot but hamstring was sore).  Hamstring seems to be getting better.    and reported as 5/10 on pain scale. General health as reported by patient is good. Pertinent medical history includes:  Overweight.   Other medical allergies details: none.  Surgeries include:  None.  Current medications:  Anti-inflammatory.   Primary job tasks include:  Computer work and prolonged sitting.  Pain is described as aching, burning and sharp and is intermittent. Pain is worse during the day. Since onset symptoms are unchanged (has not been running but softball still provokes). Imaging testing: none.     Patient is legal. Restrictions include:  Working in normal job without restrictions.    Barriers include:  None as reported by patient.  Red flags:  Pain at rest/night.  Type of problem:  Left ankle and left foot    This is a new condition    Patient reports pain:  Sub calcaneus (also lateral and medial posterior heel).  Associated symptoms:  Loss of motion/stiffness. Symptoms are exacerbated by walking (getting up after sitting, day after softball, unable to run (has not run in 3 weeks other than softball), getting up after sitting at work) and relieved by ice and NSAID's (plantar fascia night splint. foam roll in low back?).                      Objective:        Flexibility/Screens:   Positive screens:  Lumbar (+) L SLR with foot DF although (-) B slump    Lower Extremity:  Decreased left  lower extremity flexibility:Hamstrings and Soleus            Ankle/Foot Evaluation  ROM:    AROM:    Dorsiflexion:  Left:   5  Right:   5  Plantarflexion:  Left:  50    Right:  55  Inversion:  Left:  WNL     Right:  WNL  Eversion:  WNL     Right:  WNL      PROM:    Dorsiflexion:  Left:    10     Right:   10                 Strength is normal (All strength 5/5 WNL including all diagonals.  MMT hip ABD L 4-/5, R 4/5, hip ext B 5-/5).  LIGAMENT TESTING: normal                PALPATION: normal    Left ankle tenderness not present at:   gastroc/soleus; achilles tendon; plantar fascia or medial calcaneal    EDEMA: normal          MOBILITY TESTING: normal              FUNCTIONAL TESTS:         Quad:  Single Leg Squat Left: strain to hamstring  Control is mild loss of control.  Single Leg Squat Right: Control is mild loss of control  Bilateral Leg Squat:  Control is mild loss of control    Proprioception:  [place] Balance Test: Left: 30 sec with hip drop  Right: 30 sec with hip drop        Lumbar/SI Evaluation  ROM:  Arom wnl lumbar: 0/10 baseline prone assess REIL x 10 NE/NE to L foot pain with inversion.      Lumbar Myotomes:  normal                  Neural Tension/Mobility:    Left side:  SLR and SLR w/DF (pain in LB) positive.  Left side:Slump  negative.     Right side:   SLR w/DF; Slump or SLR  negative.   Lumbar Palpation:    Tenderness present at Left:    Ischial Tuberosity  Tenderness not present at Left:    Hamstrings    Tenderness not present at Right:  Ischial Tuberosity or Hamstrings                                                     General     ROS    Assessment/Plan:    Patient is a 44 year old female with L foot and L hamstring complaints.    Patient has the following significant findings with corresponding treatment plan.                Diagnosis 1:  L foot pain    Pain -  manual therapy, self management, education and home program  Decreased ROM/flexibility - manual therapy, therapeutic exercise and home  program  Decreased strength - therapeutic exercise, therapeutic activities and home program  Decreased proprioception - neuro re-education, therapeutic activities and home program  Decreased function - therapeutic activities and home program  Diagnosis 2: L hamstring strain  Pain -  manual therapy, self management, education and home program  Decreased ROM/flexibility - manual therapy, therapeutic exercise and home program  Decreased strength - therapeutic exercise, therapeutic activities and home program  Decreased proprioception - neuro re-education, therapeutic activities and home program  Decreased function - therapeutic activities and home program    Therapy Evaluation Codes:      Cumulative Therapy Evaluation is: Low complexity.    Previous and current functional limitations:  (See Goal Flow Sheet for this information)    Short term and Long term goals: (See Goal Flow Sheet for this information)     Communication ability:  Patient appears to be able to clearly communicate and understand verbal and written communication and follow directions correctly.  Treatment Explanation - The following has been discussed with the patient:   RX ordered/plan of care  Anticipated outcomes  Possible risks and side effects  This patient would benefit from PT intervention to resume normal activities.   Rehab potential is good.    Frequency:  1 X week, once daily  Duration:  for 6 weeks  Discharge Plan:  Achieve all LTG.  Independent in home treatment program.  Reach maximal therapeutic benefit.    Please refer to the daily flowsheet for treatment today, total treatment time and time spent performing 1:1 timed codes.

## 2019-09-23 ENCOUNTER — THERAPY VISIT (OUTPATIENT)
Dept: PHYSICAL THERAPY | Facility: CLINIC | Age: 44
End: 2019-09-23
Payer: COMMERCIAL

## 2019-09-23 DIAGNOSIS — M72.2 PLANTAR FASCIITIS: ICD-10-CM

## 2019-09-23 DIAGNOSIS — S76.312A STRAIN OF LEFT HAMSTRING MUSCLE: ICD-10-CM

## 2019-09-23 PROCEDURE — 97110 THERAPEUTIC EXERCISES: CPT | Mod: GP | Performed by: PHYSICAL THERAPIST

## 2019-09-23 PROCEDURE — 97112 NEUROMUSCULAR REEDUCATION: CPT | Mod: GP | Performed by: PHYSICAL THERAPIST

## 2020-03-10 ENCOUNTER — HEALTH MAINTENANCE LETTER (OUTPATIENT)
Age: 45
End: 2020-03-10

## 2020-03-16 PROBLEM — S76.312A STRAIN OF LEFT HAMSTRING MUSCLE: Status: RESOLVED | Noted: 2019-09-09 | Resolved: 2020-03-16

## 2020-03-16 PROBLEM — M72.2 PLANTAR FASCIITIS: Status: RESOLVED | Noted: 2019-09-09 | Resolved: 2020-03-16

## 2020-03-16 NOTE — PROGRESS NOTES
Discharge Note    Progress reporting period is from initial evaluation date (please see noted date below) to Sep 23, 2019.  Linked Episodes   Type: Episode: Status: Noted: Resolved: Last update: Updated by:   PHYSICAL THERAPY L foot and hamstring 9/8/19 Active 9/9/2019 9/23/2019  2:49 PM Vida Ivan, PT      Comments:       Lisbet failed to follow up and current status is unknown.  Please see information below for last relevant information on current status.  Patient seen for 2 visits.    SUBJECTIVE  Subjective changes noted by patient:  Hamstring/groin region strain is getting better but still not able to just go out and run.  Has not had to do a lot of running with softball  so has not challenged the foot. Still can get stiffness getting up after sitting (only when has legs elevated in soft couch).  Night splint still seems to help.  .  Current pain level is 0/10.     Previous pain level was  5/10.   Changes in function:  Yes (See Goal flowsheet attached for changes in current functional level)  Adverse reaction to treatment or activity: None    OBJECTIVE  Changes noted in objective findings: hip drop B with bridge #2a, improved control with cueing.  No pain upon palpation L foot.  SLS tendency for hip drop, with cueing for gluteal activation, legs apart control improves.     ASSESSMENT/PLAN  Diagnosis: L foot pain   Updated problem list and treatment plan:   Pain - HEP  Decreased ROM/flexibility - HEP  Decreased function - HEP  Decreased strength - HEP  Decreased proprioception - HEP  STG/LTGs have been met or progress has been made towards goals:  Yes, please see goal flowsheet for most current information  Assessment of Progress: current status is unknown.    Last current status: Pt is progressing as expected   Self Management Plans:  HEP  I have re-evaluated this patient and find that the nature, scope, duration and intensity of the therapy is appropriate for the medical condition of the patient.  Lisbet  continues to require the following intervention to meet STG and LTG's:  HEP.    Recommendations:  Discharge with current home program.  Patient to follow up with MD as needed.    Please refer to the daily flowsheet for treatment today, total treatment time and time spent performing 1:1 timed codes.

## 2020-12-27 ENCOUNTER — HEALTH MAINTENANCE LETTER (OUTPATIENT)
Age: 45
End: 2020-12-27

## 2021-04-24 ENCOUNTER — HEALTH MAINTENANCE LETTER (OUTPATIENT)
Age: 46
End: 2021-04-24

## 2021-10-09 ENCOUNTER — HEALTH MAINTENANCE LETTER (OUTPATIENT)
Age: 46
End: 2021-10-09

## 2022-05-21 ENCOUNTER — HEALTH MAINTENANCE LETTER (OUTPATIENT)
Age: 47
End: 2022-05-21

## 2022-07-10 ENCOUNTER — HEALTH MAINTENANCE LETTER (OUTPATIENT)
Age: 47
End: 2022-07-10

## 2022-09-11 ENCOUNTER — HEALTH MAINTENANCE LETTER (OUTPATIENT)
Age: 47
End: 2022-09-11

## 2023-05-14 ENCOUNTER — APPOINTMENT (OUTPATIENT)
Dept: RADIOLOGY | Facility: CLINIC | Age: 48
End: 2023-05-14
Attending: EMERGENCY MEDICINE
Payer: COMMERCIAL

## 2023-05-14 ENCOUNTER — HOSPITAL ENCOUNTER (EMERGENCY)
Facility: CLINIC | Age: 48
Discharge: HOME | End: 2023-05-14
Admitting: EMERGENCY MEDICINE
Payer: COMMERCIAL

## 2023-05-14 VITALS
WEIGHT: 230 LBS | TEMPERATURE: 99.1 F | BODY MASS INDEX: 39.27 KG/M2 | RESPIRATION RATE: 18 BRPM | HEIGHT: 64 IN | HEART RATE: 102 BPM | SYSTOLIC BLOOD PRESSURE: 138 MMHG | DIASTOLIC BLOOD PRESSURE: 85 MMHG | OXYGEN SATURATION: 100 %

## 2023-05-14 DIAGNOSIS — J06.9 UPPER RESPIRATORY INFECTION: ICD-10-CM

## 2023-05-14 DIAGNOSIS — R07.89 CHEST PRESSURE: ICD-10-CM

## 2023-05-14 LAB
ALBUMIN SERPL-MCNC: 4 G/DL (ref 3.5–5)
ALBUMIN UR-MCNC: NEGATIVE MG/DL
ALP SERPL-CCNC: 74 U/L (ref 45–120)
ALT SERPL W P-5'-P-CCNC: 19 U/L (ref 0–45)
ANION GAP SERPL CALCULATED.3IONS-SCNC: 12 MMOL/L (ref 5–18)
APPEARANCE UR: CLEAR
AST SERPL W P-5'-P-CCNC: 20 U/L (ref 0–40)
ATRIAL RATE - MUSE: 108 BPM
BASOPHILS # BLD AUTO: 0 10E3/UL (ref 0–0.2)
BASOPHILS NFR BLD AUTO: 0 %
BILIRUB SERPL-MCNC: 0.6 MG/DL (ref 0–1)
BILIRUB UR QL STRIP: NEGATIVE
BUN SERPL-MCNC: 8 MG/DL (ref 8–22)
CALCIUM SERPL-MCNC: 9.3 MG/DL (ref 8.5–10.5)
CHLORIDE BLD-SCNC: 101 MMOL/L (ref 98–107)
CO2 SERPL-SCNC: 21 MMOL/L (ref 22–31)
COLOR UR AUTO: ABNORMAL
CREAT SERPL-MCNC: 0.8 MG/DL (ref 0.6–1.1)
D DIMER PPP FEU-MCNC: <=0.27 UG/ML FEU (ref 0–0.5)
DIASTOLIC BLOOD PRESSURE - MUSE: NORMAL MMHG
EOSINOPHIL # BLD AUTO: 0 10E3/UL (ref 0–0.7)
EOSINOPHIL NFR BLD AUTO: 0 %
ERYTHROCYTE [DISTWIDTH] IN BLOOD BY AUTOMATED COUNT: 13.2 % (ref 10–15)
FLUAV RNA SPEC QL NAA+PROBE: NEGATIVE
FLUBV RNA RESP QL NAA+PROBE: NEGATIVE
GFR SERPL CREATININE-BSD FRML MDRD: >90 ML/MIN/1.73M2
GLUCOSE BLD-MCNC: 113 MG/DL (ref 70–125)
GLUCOSE UR STRIP-MCNC: NEGATIVE MG/DL
GROUP A STREP BY PCR: NOT DETECTED
HCG UR QL: NEGATIVE
HCT VFR BLD AUTO: 41.8 % (ref 35–47)
HGB BLD-MCNC: 14.1 G/DL (ref 11.7–15.7)
HGB UR QL STRIP: ABNORMAL
IMM GRANULOCYTES # BLD: 0.1 10E3/UL
IMM GRANULOCYTES NFR BLD: 0 %
INTERPRETATION ECG - MUSE: NORMAL
KETONES UR STRIP-MCNC: ABNORMAL MG/DL
LEUKOCYTE ESTERASE UR QL STRIP: NEGATIVE
LYMPHOCYTES # BLD AUTO: 1.5 10E3/UL (ref 0.8–5.3)
LYMPHOCYTES NFR BLD AUTO: 11 %
MCH RBC QN AUTO: 29.8 PG (ref 26.5–33)
MCHC RBC AUTO-ENTMCNC: 33.7 G/DL (ref 31.5–36.5)
MCV RBC AUTO: 88 FL (ref 78–100)
MONOCYTES # BLD AUTO: 1.2 10E3/UL (ref 0–1.3)
MONOCYTES NFR BLD AUTO: 9 %
MONOCYTES NFR BLD AUTO: NEGATIVE %
NEUTROPHILS # BLD AUTO: 10.9 10E3/UL (ref 1.6–8.3)
NEUTROPHILS NFR BLD AUTO: 80 %
NITRATE UR QL: NEGATIVE
NRBC # BLD AUTO: 0 10E3/UL
NRBC BLD AUTO-RTO: 0 /100
P AXIS - MUSE: 50 DEGREES
PH UR STRIP: 6.5 [PH] (ref 5–7)
PLATELET # BLD AUTO: 318 10E3/UL (ref 150–450)
POTASSIUM BLD-SCNC: 4.3 MMOL/L (ref 3.5–5)
PR INTERVAL - MUSE: 136 MS
PROT SERPL-MCNC: 8.2 G/DL (ref 6–8)
QRS DURATION - MUSE: 84 MS
QT - MUSE: 310 MS
QTC - MUSE: 415 MS
R AXIS - MUSE: 8 DEGREES
RBC # BLD AUTO: 4.73 10E6/UL (ref 3.8–5.2)
RBC URINE: 1 /HPF
RSV RNA SPEC NAA+PROBE: NEGATIVE
SARS-COV-2 RNA RESP QL NAA+PROBE: NEGATIVE
SODIUM SERPL-SCNC: 134 MMOL/L (ref 136–145)
SP GR UR STRIP: 1.01 (ref 1–1.03)
SQUAMOUS EPITHELIAL: <1 /HPF
SYSTOLIC BLOOD PRESSURE - MUSE: NORMAL MMHG
T AXIS - MUSE: 38 DEGREES
TROPONIN I SERPL-MCNC: <0.01 NG/ML (ref 0–0.29)
UROBILINOGEN UR STRIP-MCNC: <2 MG/DL
VENTRICULAR RATE- MUSE: 108 BPM
WBC # BLD AUTO: 13.6 10E3/UL (ref 4–11)
WBC URINE: <1 /HPF

## 2023-05-14 PROCEDURE — 86308 HETEROPHILE ANTIBODY SCREEN: CPT | Performed by: EMERGENCY MEDICINE

## 2023-05-14 PROCEDURE — 81025 URINE PREGNANCY TEST: CPT | Performed by: EMERGENCY MEDICINE

## 2023-05-14 PROCEDURE — 36415 COLL VENOUS BLD VENIPUNCTURE: CPT | Performed by: EMERGENCY MEDICINE

## 2023-05-14 PROCEDURE — 87651 STREP A DNA AMP PROBE: CPT | Performed by: EMERGENCY MEDICINE

## 2023-05-14 PROCEDURE — 80053 COMPREHEN METABOLIC PANEL: CPT | Performed by: EMERGENCY MEDICINE

## 2023-05-14 PROCEDURE — 71046 X-RAY EXAM CHEST 2 VIEWS: CPT

## 2023-05-14 PROCEDURE — 81001 URINALYSIS AUTO W/SCOPE: CPT | Performed by: EMERGENCY MEDICINE

## 2023-05-14 PROCEDURE — 93005 ELECTROCARDIOGRAM TRACING: CPT | Performed by: EMERGENCY MEDICINE

## 2023-05-14 PROCEDURE — 85379 FIBRIN DEGRADATION QUANT: CPT | Performed by: EMERGENCY MEDICINE

## 2023-05-14 PROCEDURE — 250N000011 HC RX IP 250 OP 636: Performed by: EMERGENCY MEDICINE

## 2023-05-14 PROCEDURE — 85025 COMPLETE CBC W/AUTO DIFF WBC: CPT | Performed by: EMERGENCY MEDICINE

## 2023-05-14 PROCEDURE — 87637 SARSCOV2&INF A&B&RSV AMP PRB: CPT | Performed by: EMERGENCY MEDICINE

## 2023-05-14 PROCEDURE — 258N000003 HC RX IP 258 OP 636: Performed by: EMERGENCY MEDICINE

## 2023-05-14 PROCEDURE — 96361 HYDRATE IV INFUSION ADD-ON: CPT

## 2023-05-14 PROCEDURE — 84484 ASSAY OF TROPONIN QUANT: CPT | Performed by: EMERGENCY MEDICINE

## 2023-05-14 PROCEDURE — 96374 THER/PROPH/DIAG INJ IV PUSH: CPT

## 2023-05-14 PROCEDURE — 99285 EMERGENCY DEPT VISIT HI MDM: CPT | Mod: 25

## 2023-05-14 RX ORDER — KETOROLAC TROMETHAMINE 15 MG/ML
15 INJECTION, SOLUTION INTRAMUSCULAR; INTRAVENOUS ONCE
Status: COMPLETED | OUTPATIENT
Start: 2023-05-14 | End: 2023-05-14

## 2023-05-14 RX ADMIN — KETOROLAC TROMETHAMINE 15 MG: 15 INJECTION, SOLUTION INTRAMUSCULAR; INTRAVENOUS at 17:01

## 2023-05-14 RX ADMIN — SODIUM CHLORIDE 1000 ML: 9 INJECTION, SOLUTION INTRAVENOUS at 17:00

## 2023-05-14 ASSESSMENT — ENCOUNTER SYMPTOMS
COUGH: 0
DIARRHEA: 0
VOMITING: 0
NAUSEA: 1
SORE THROAT: 1
DYSURIA: 0
ABDOMINAL PAIN: 0
NECK PAIN: 1
HEADACHES: 1
SHORTNESS OF BREATH: 0
HEMATURIA: 0
BACK PAIN: 1
FATIGUE: 1

## 2023-05-14 ASSESSMENT — ACTIVITIES OF DAILY LIVING (ADL): ADLS_ACUITY_SCORE: 35

## 2023-05-14 NOTE — ED PROVIDER NOTES
EMERGENCY DEPARTMENT ENCOUNTER      NAME: Lisbet Dimas  AGE: 47 year old female  YOB: 1975  MRN: [id number]  EVALUATION DATE & TIME: 5/14/2023  4:29 PM    PCP: Yani Cast    ED PROVIDER: Shaylee Arredondo PA-C      Chief Complaint   Patient presents with     Pharyngitis     Chest Pain         FINAL IMPRESSION:  1. Upper respiratory infection    2. Chest pressure          ED COURSE & MEDICAL DECISION MAKING:    Pertinent Labs & Imaging studies reviewed. (See chart for details)    47 year old female presents to the Emergency Department for evaluation of multiple concerns.    Physical exam is remarkable for a generally well-appearing female who is in no acute distress.  Heart and lung sounds are clear diffusely throughout, no respiratory distress.  She has nasal congestion on exam.  2+ bilateral tonsillar swelling with erythema, no exudates noted. TMs retracted bilaterally without erythema or effusions. Abdomen is soft and nontender.  Vital signs initially remarkable for tachycardia and hypertension, improved with IV fluids, remainder are stable and she is afebrile.    CBC is remarkable for leukocytosis with white blood cell count of 13.6, no anemia noted.  CMP is unremarkable with no significant electrolyte derangements, normal liver and kidney function. Troponin is negative, EKG shows sinus tachycardia but no ischemic changes.  D-dimer is negative.  Mono is negative.  COVID/flu/RSV swab is negative.  Strep is negative.  Urinalysis without evidence of infection, pregnancy test negative.  Chest x-ray is unremarkable.    The patient was given Toradol and IV fluids with improvement in her symptoms.  I do not think any further emergent labs or imaging are indicated at this time.  She is hemodynamically stable here and her work-up is overall reassuring.  She does not have any obvious source of infection, no concern for sepsis clinically at this time.  Her abdomen is completely benign on exam and  abdominal lab work-up is reassuring.  I do not think repeat troponin is indicated and patient with heart score of 1 given negative initial troponin and nonischemic EKG with more than 6 hours of symptoms.  She is low risk for PE by Wells criteria with negative D-dimer.  I suspect she has an upper respiratory infection, advised her to take Tylenol and ibuprofen at home for symptoms and to follow-up with her primary care provider for recheck.  Recommend return here for any new or worsening symptoms.  The patient is agreeable with this treatment plan and verbalized understanding.      Medical Decision Making    History:    Supplemental history from: Documented in chart, if applicable    External Record(s) reviewed: Outpatient Record: [facility] exam 03/27/23    Work Up:    Chart documentation includes differential considered and any EKGs or imaging independently interpreted by provider, where specified.    In additional to work up documented, I considered the following work up: Labs venous lactate, blood culture, but deferred due to vitals stabilized and patient does not have any source of infectious process.    External consultation:    Discussion of management with another provider: Documented in chart, if applicable    Complicating factors:    Care impacted by chronic illness: N/A    Care affected by social determinants of health: N/A    Disposition considerations: Discharge. No recommendations on prescription strength medication(s). I considered admission, but ultimately discharged patient based on reassuring labs and imaging.    ED Course   4:43 PM Performed my initial history and physical exam. Discussed workup in the emergency department, management of symptoms, and likely disposition.   5:52 PM I updated patient, she is feeling improved. I discussed the plan for discharge with the patient or family and they are agreeable.. We discussed supportive cares at home and reasons for return to the ER including new or  worsening symptoms - all questions and concerns addressed. Patient to be discharged by RN.    At the conclusion of the encounter I discussed the results of all of the tests and the disposition. The questions were answered. The patient or family acknowledged understanding and was agreeable with the care plan.     Voice recognition software was used in the creation of this note. Any grammatical or nonsensical errors are due to inherent errors with the software and are not the intention of the writer.     MEDICATIONS GIVEN IN THE EMERGENCY:  Medications   ketorolac (TORADOL) injection 15 mg (15 mg Intravenous $Given 5/14/23 3711)   0.9% sodium chloride BOLUS (0 mLs Intravenous Stopped 5/14/23 5333)       NEW PRESCRIPTIONS STARTED AT TODAY'S ER VISIT  New Prescriptions    No medications on file            =================================================================    HPI    Patient information was obtained from: Patient    Use of : N/A        Lisbet iDmas is a 47 year old female who presents via walk-in for evaluation of chest pressure.    Patient endorses intermittent chest pressure over the last 2 days with no provoking or palliating factors. She notes having similar chest pressure in the past but states it has not been this persistent before.    Patient also endorses fatigue, nausea, sore throat, congestion, lower back pain, neck pain, and headache. She has been taking tylenol with moderate relief. She says she has had a heart rate of 90 bpm over the past 2 days, up from her baseline of 75, and measured her blood pressure at 153 systolic at [employer] yesterday.    Patient denies any history of blood clots. Her LMP was 1.5 weeks ago. Patient denies any sick contacts but notes her child does attend . She was covid negative at home today.    Patient denies shortness of breath, cough, vomiting, diarrhea, abdominal pain, dysuria, hematuria, and all other complaint at this time.      REVIEW OF  "SYSTEMS   Review of Systems   Constitutional: Positive for fatigue.   HENT: Positive for congestion and sore throat.    Respiratory: Negative for cough and shortness of breath.    Cardiovascular: Positive for chest pain (pressure).   Gastrointestinal: Positive for nausea. Negative for abdominal pain, diarrhea and vomiting.   Genitourinary: Negative for dysuria and hematuria.   Musculoskeletal: Positive for back pain and neck pain.   Neurological: Positive for headaches.   All other systems reviewed and are negative.      All other systems reviewed and are negative unless noted in HPI.      PAST MEDICAL HISTORY:  History reviewed. No pertinent past medical history.    PAST SURGICAL HISTORY:  Past Surgical History:   Procedure Laterality Date     DE DENTAL SURGERY PROCEDURE      Description: Oral Surgery Tooth Extraction;  Recorded: 10/07/2014;       CURRENT MEDICATIONS:    Biotin 5000 MCG CAPS  clobetasol propionate (CLOBEX) 0.05 % SHAM  Multiple vitamin TABS  norethindrone-ethinyl estradiol-iron (BLISOVI FE 1.5/30) 1.5-30 MG-MCG per tablet        ALLERGIES:  No Known Allergies    FAMILY HISTORY:  Family History   Problem Relation Age of Onset     Skin Cancer Father      Melanoma No family hx of        SOCIAL HISTORY:   Social History     Socioeconomic History     Marital status:    Tobacco Use     Smoking status: Never     Smokeless tobacco: Never       VITALS:  Patient Vitals for the past 24 hrs:   BP Temp Temp src Pulse Resp SpO2 Height Weight   05/14/23 1802 138/85 -- -- 102 18 100 % -- --   05/14/23 1619 (!) 147/87 99.1  F (37.3  C) Temporal 115 20 95 % 1.626 m (5' 4\") 104.3 kg (230 lb)       PHYSICAL EXAM    VITAL SIGNS: /85   Pulse 102   Temp 99.1  F (37.3  C) (Temporal)   Resp 18   Ht 1.626 m (5' 4\")   Wt 104.3 kg (230 lb)   SpO2 100%   BMI 39.48 kg/m    General Appearance: Alert, cooperative, normal speech and facial symmetry, appears stated age, the patient does not appear in " distress  Head:  Normocephalic, without obvious abnormality, atraumatic  Eyes: Conjunctiva/corneas clear, EOM's intact, no nystagmus, PERRL  ENT:  Nasal congestion on exam.  2+ bilateral tonsillar swelling with erythema, no exudates noted. TMs retracted bilaterally without erythema or effusions. Lips, mucosa, and tongue normal; teeth and gums normal, no dysphonia or difficulty swallowing, membranes are moist without pallor  Cardio:  Regular rate and rhythm, S1 and S2 normal, no murmur, rub    or gallop, 2+ pulses symmetric in all extremities  Pulm:  Clear to auscultation bilaterally, respirations unlabored with no accessory muscle use  Abdomen:  Abdomen is soft, non-distended with no tenderness to palpation, rebound tenderness, or guarding.   Extremities: Moves all extremities  Neuro: Patient is awake, alert, and responsive to voice. No gross motor weaknesses or sensory loss; moves all extremities.    LAB:  All pertinent labs reviewed and interpreted.  Labs Ordered and Resulted from Time of ED Arrival to Time of ED Departure   COMPREHENSIVE METABOLIC PANEL - Abnormal       Result Value    Sodium 134 (*)     Potassium 4.3      Chloride 101      Carbon Dioxide (CO2) 21 (*)     Anion Gap 12      Urea Nitrogen 8      Creatinine 0.80      Calcium 9.3      Glucose 113      Alkaline Phosphatase 74      AST 20      ALT 19      Protein Total 8.2 (*)     Albumin 4.0      Bilirubin Total 0.6      GFR Estimate >90     ROUTINE UA WITH MICROSCOPIC REFLEX TO CULTURE - Abnormal    Color Urine Light Yellow      Appearance Urine Clear      Glucose Urine Negative      Bilirubin Urine Negative      Ketones Urine Trace (*)     Specific Gravity Urine 1.010      Blood Urine 0.03 mg/dL (*)     pH Urine 6.5      Protein Albumin Urine Negative      Urobilinogen Urine <2.0      Nitrite Urine Negative      Leukocyte Esterase Urine Negative      RBC Urine 1      WBC Urine <1      Squamous Epithelials Urine <1     CBC WITH PLATELETS AND  DIFFERENTIAL - Abnormal    WBC Count 13.6 (*)     RBC Count 4.73      Hemoglobin 14.1      Hematocrit 41.8      MCV 88      MCH 29.8      MCHC 33.7      RDW 13.2      Platelet Count 318      % Neutrophils 80      % Lymphocytes 11      % Monocytes 9      % Eosinophils 0      % Basophils 0      % Immature Granulocytes 0      NRBCs per 100 WBC 0      Absolute Neutrophils 10.9 (*)     Absolute Lymphocytes 1.5      Absolute Monocytes 1.2      Absolute Eosinophils 0.0      Absolute Basophils 0.0      Absolute Immature Granulocytes 0.1      Absolute NRBCs 0.0     TROPONIN I - Normal    Troponin I <0.01     D DIMER QUANTITATIVE - Normal    D-Dimer Quantitative <=0.27     HCG QUALITATIVE URINE - Normal    hCG Urine Qualitative Negative     INFLUENZA A/B, RSV, & SARS-COV2 PCR - Normal    Influenza A PCR Negative      Influenza B PCR Negative      RSV PCR Negative      SARS CoV2 PCR Negative     MONONUCLEOSIS SCREEN - Normal    Mononucleosis Screen Negative     GROUP A STREPTOCOCCUS PCR THROAT SWAB - Normal    Group A strep by PCR Not Detected         RADIOLOGY:  Reviewed all pertinent imaging. Please see official radiology report.  Chest XR,  PA & LAT   Final Result   IMPRESSION: Negative chest.          EKG:    Performed at: 5/14/23 9675    I have independently reviewed and interpreted the EKG, along with the final read. EKG also reviewed by Dr. Gibson.    Ventricular rate 108 bpm  IN interval 136 ms  QRS duration 84 ms  QT/QTc 310/415 ms  P-R-T axes 50 8 38    Impression: Sinus tachycardia    I, Mello Lion, am serving as a scribe to document services personally performed by Shaylee Arredondo PA-C based on my observation and the provider's statements to me. I, Shaylee Arredondo PA-C attest that Mello Lion is acting in a scribe capacity, has observed my performance of the services and has documented them in accordance with my direction.     Shaylee Arredondo PA-C  Emergency Medicine  [facility]  [facility] EMERGENCY ROOM  [address] [place] [address]  [phone]  Dept: [phone]      Shaylee Arredondo PA-C  05/14/23 1846

## 2023-05-14 NOTE — DISCHARGE INSTRUCTIONS
You were seen here today for evaluation of sore throat and chest pressure.  Your lab work today is reassuring with no evidence of significant infection in your blood or urine, electrolyte problems, liver or kidney dysfunction, heart attack, or blood clots.  Your chest x-ray was normal.  Your strep, COVID, flu, RSV test were all negative.    I suspect you have a viral illness that should improve over the next few days. You may take Tylenol and ibuprofen for pain/fever, do not exceed 4000 mg of Tylenol per day or 3200 mg ofibuprofen per day.    Follow-up with your primary care provider for a recheck this week and return here for any new or worsening symptoms including worsening chest pain or pressure, difficulty breathing, fever despite medication, persistent vomiting, or any other symptoms that concern you.

## 2023-05-14 NOTE — ED TRIAGE NOTES
Pt reports sore throat and neck pain that started yesterday.  She states she also has chest pressure and nausea.  States she did a covid test at home which was negative.  Took tylenol at 1030.     Triage Assessment     Row Name 05/14/23 1427       Triage Assessment (Adult)    Airway WDL WDL       Respiratory WDL    Respiratory WDL WDL       Cardiac WDL    Cardiac WDL WDL       Peripheral/Neurovascular WDL    Peripheral Neurovascular WDL WDL

## 2023-07-29 ENCOUNTER — HEALTH MAINTENANCE LETTER (OUTPATIENT)
Age: 48
End: 2023-07-29

## 2024-05-04 ENCOUNTER — HEALTH MAINTENANCE LETTER (OUTPATIENT)
Age: 49
End: 2024-05-04

## 2025-05-17 ENCOUNTER — HEALTH MAINTENANCE LETTER (OUTPATIENT)
Age: 50
End: 2025-05-17

## 2025-08-09 ENCOUNTER — HEALTH MAINTENANCE LETTER (OUTPATIENT)
Age: 50
End: 2025-08-09